# Patient Record
Sex: MALE | Race: WHITE | NOT HISPANIC OR LATINO | Employment: OTHER | URBAN - METROPOLITAN AREA
[De-identification: names, ages, dates, MRNs, and addresses within clinical notes are randomized per-mention and may not be internally consistent; named-entity substitution may affect disease eponyms.]

---

## 2021-10-14 ENCOUNTER — HOSPITAL ENCOUNTER (EMERGENCY)
Facility: HOSPITAL | Age: 41
Discharge: HOME | End: 2021-10-14
Attending: EMERGENCY MEDICINE | Admitting: EMERGENCY MEDICINE

## 2021-10-14 ENCOUNTER — APPOINTMENT (OUTPATIENT)
Dept: CT IMAGING | Facility: HOSPITAL | Age: 41
End: 2021-10-14

## 2021-10-14 ENCOUNTER — APPOINTMENT (OUTPATIENT)
Dept: GENERAL RADIOLOGY | Facility: HOSPITAL | Age: 41
End: 2021-10-14

## 2021-10-14 VITALS
OXYGEN SATURATION: 94 % | DIASTOLIC BLOOD PRESSURE: 73 MMHG | RESPIRATION RATE: 23 BRPM | WEIGHT: 208 LBS | SYSTOLIC BLOOD PRESSURE: 103 MMHG | TEMPERATURE: 98.3 F | HEIGHT: 70 IN | BODY MASS INDEX: 29.78 KG/M2 | HEART RATE: 87 BPM

## 2021-10-14 DIAGNOSIS — N17.0 ACUTE KIDNEY INJURY (AKI) WITH ACUTE TUBULAR NECROSIS (ATN) (HCC): ICD-10-CM

## 2021-10-14 DIAGNOSIS — S20.211A CONTUSION OF RIB ON RIGHT SIDE, INITIAL ENCOUNTER: ICD-10-CM

## 2021-10-14 DIAGNOSIS — E86.0 DEHYDRATION: ICD-10-CM

## 2021-10-14 DIAGNOSIS — S16.1XXA STRAIN OF NECK MUSCLE, INITIAL ENCOUNTER: ICD-10-CM

## 2021-10-14 DIAGNOSIS — S00.93XA CONTUSION OF HEAD, UNSPECIFIED PART OF HEAD, INITIAL ENCOUNTER: ICD-10-CM

## 2021-10-14 DIAGNOSIS — R55 SYNCOPE AND COLLAPSE: Primary | ICD-10-CM

## 2021-10-14 LAB
ALBUMIN SERPL-MCNC: 4.8 G/DL (ref 3.5–5.2)
ALBUMIN/GLOB SERPL: 1.5 G/DL
ALP SERPL-CCNC: 59 U/L (ref 39–117)
ALT SERPL W P-5'-P-CCNC: 54 U/L (ref 1–41)
ANION GAP SERPL CALCULATED.3IONS-SCNC: 25.4 MMOL/L (ref 5–15)
AST SERPL-CCNC: 48 U/L (ref 1–40)
BILIRUB SERPL-MCNC: 0.3 MG/DL (ref 0–1.2)
BUN SERPL-MCNC: 15 MG/DL (ref 6–20)
BUN/CREAT SERPL: 7.9 (ref 7–25)
CALCIUM SPEC-SCNC: 9.5 MG/DL (ref 8.6–10.5)
CHLORIDE SERPL-SCNC: 101 MMOL/L (ref 98–107)
CK SERPL-CCNC: 166 U/L (ref 20–200)
CO2 SERPL-SCNC: 10.6 MMOL/L (ref 22–29)
CREAT SERPL-MCNC: 1.9 MG/DL (ref 0.76–1.27)
DEPRECATED RDW RBC AUTO: 44.1 FL (ref 37–54)
EOSINOPHIL # BLD MANUAL: 0.19 10*3/MM3 (ref 0–0.4)
EOSINOPHIL NFR BLD MANUAL: 2 % (ref 0.3–6.2)
ERYTHROCYTE [DISTWIDTH] IN BLOOD BY AUTOMATED COUNT: 12.1 % (ref 12.3–15.4)
GFR SERPL CREATININE-BSD FRML MDRD: 40 ML/MIN/1.73
GLOBULIN UR ELPH-MCNC: 3.1 GM/DL
GLUCOSE SERPL-MCNC: 133 MG/DL (ref 65–99)
HCT VFR BLD AUTO: 48.3 % (ref 37.5–51)
HGB BLD-MCNC: 16.1 G/DL (ref 13–17.7)
HOLD SPECIMEN: NORMAL
HOLD SPECIMEN: NORMAL
LYMPHOCYTES # BLD MANUAL: 3.53 10*3/MM3 (ref 0.7–3.1)
LYMPHOCYTES NFR BLD MANUAL: 1 % (ref 5–12)
LYMPHOCYTES NFR BLD MANUAL: 31 % (ref 19.6–45.3)
MAGNESIUM SERPL-MCNC: 3.4 MG/DL (ref 1.6–2.6)
MCH RBC QN AUTO: 32.9 PG (ref 26.6–33)
MCHC RBC AUTO-ENTMCNC: 33.3 G/DL (ref 31.5–35.7)
MCV RBC AUTO: 98.6 FL (ref 79–97)
METAMYELOCYTES NFR BLD MANUAL: 2 % (ref 0–0)
MONOCYTES # BLD AUTO: 0.1 10*3/MM3 (ref 0.1–0.9)
NEUTROPHILS # BLD AUTO: 5.53 10*3/MM3 (ref 1.7–7)
NEUTROPHILS NFR BLD MANUAL: 57 % (ref 42.7–76)
NEUTS BAND NFR BLD MANUAL: 1 % (ref 0–5)
PLATELET # BLD AUTO: 208 10*3/MM3 (ref 140–450)
PMV BLD AUTO: 10.8 FL (ref 6–12)
POTASSIUM SERPL-SCNC: 4.7 MMOL/L (ref 3.5–5.2)
PROT SERPL-MCNC: 7.9 G/DL (ref 6–8.5)
RBC # BLD AUTO: 4.9 10*6/MM3 (ref 4.14–5.8)
RBC MORPH BLD: NORMAL
SCAN SLIDE: NORMAL
SMALL PLATELETS BLD QL SMEAR: ADEQUATE
SODIUM SERPL-SCNC: 137 MMOL/L (ref 136–145)
TROPONIN T SERPL-MCNC: <0.01 NG/ML (ref 0–0.03)
VARIANT LYMPHS NFR BLD MANUAL: 6 % (ref 0–5)
WBC # BLD AUTO: 9.54 10*3/MM3 (ref 3.4–10.8)
WBC MORPH BLD: NORMAL
WHOLE BLOOD HOLD SPECIMEN: NORMAL
WHOLE BLOOD HOLD SPECIMEN: NORMAL

## 2021-10-14 PROCEDURE — 82550 ASSAY OF CK (CPK): CPT | Performed by: EMERGENCY MEDICINE

## 2021-10-14 PROCEDURE — 96374 THER/PROPH/DIAG INJ IV PUSH: CPT

## 2021-10-14 PROCEDURE — 70450 CT HEAD/BRAIN W/O DYE: CPT

## 2021-10-14 PROCEDURE — 99284 EMERGENCY DEPT VISIT MOD MDM: CPT

## 2021-10-14 PROCEDURE — 85025 COMPLETE CBC W/AUTO DIFF WBC: CPT

## 2021-10-14 PROCEDURE — 84484 ASSAY OF TROPONIN QUANT: CPT

## 2021-10-14 PROCEDURE — 25010000002 ONDANSETRON PER 1 MG: Performed by: EMERGENCY MEDICINE

## 2021-10-14 PROCEDURE — 80053 COMPREHEN METABOLIC PANEL: CPT

## 2021-10-14 PROCEDURE — 72125 CT NECK SPINE W/O DYE: CPT

## 2021-10-14 PROCEDURE — 93005 ELECTROCARDIOGRAM TRACING: CPT | Performed by: EMERGENCY MEDICINE

## 2021-10-14 PROCEDURE — 83735 ASSAY OF MAGNESIUM: CPT

## 2021-10-14 PROCEDURE — 93005 ELECTROCARDIOGRAM TRACING: CPT

## 2021-10-14 PROCEDURE — 71101 X-RAY EXAM UNILAT RIBS/CHEST: CPT

## 2021-10-14 PROCEDURE — 85007 BL SMEAR W/DIFF WBC COUNT: CPT

## 2021-10-14 PROCEDURE — 93010 ELECTROCARDIOGRAM REPORT: CPT | Performed by: INTERNAL MEDICINE

## 2021-10-14 RX ORDER — ONDANSETRON 2 MG/ML
4 INJECTION INTRAMUSCULAR; INTRAVENOUS ONCE
Status: COMPLETED | OUTPATIENT
Start: 2021-10-14 | End: 2021-10-14

## 2021-10-14 RX ORDER — SODIUM CHLORIDE 0.9 % (FLUSH) 0.9 %
10 SYRINGE (ML) INJECTION AS NEEDED
Status: DISCONTINUED | OUTPATIENT
Start: 2021-10-14 | End: 2021-10-15 | Stop reason: HOSPADM

## 2021-10-14 RX ADMIN — SODIUM CHLORIDE 1000 ML: 9 INJECTION, SOLUTION INTRAVENOUS at 20:20

## 2021-10-14 RX ADMIN — ONDANSETRON 4 MG: 2 INJECTION INTRAMUSCULAR; INTRAVENOUS at 18:39

## 2021-10-14 RX ADMIN — SODIUM CHLORIDE 1000 ML: 9 INJECTION, SOLUTION INTRAVENOUS at 18:39

## 2021-10-14 NOTE — ED PROVIDER NOTES
Time: 6:24 PM EDT  Arrived by: ambulance  Chief Complaint: syncope  History provided by: patient  History is limited by: N/A     History of Present Illness:  Patient is a 41 y.o. year old male that presents to the emergency department with several syncopal episodes after running. Pt reports he fell face first and has a headache. Pt feels fatigued. Pt denies any urinary or bowel incontinence.     Pt drinks and reports his last drink was 7-8 days ago. Pt denies any drug use.       Syncope  Episode history:  Multiple  Most recent episode:  Today  Timing:  Intermittent  Progression:  Resolved  Chronicity:  New  Witnessed: yes    Relieved by:  Nothing  Worsened by:  Nothing  Associated symptoms: headaches    Associated symptoms: no chest pain, no fever, no seizures, no shortness of breath and no vomiting        Similar Symptoms Previously: no  Recently seen: no      Patient Care Team  Primary Care Provider: Provider, No Known    Past Medical History:     Allergies   Allergen Reactions   • Penicillins Unknown - High Severity     History reviewed. No pertinent past medical history.  Past Surgical History:   Procedure Laterality Date   • CHOLECYSTECTOMY       History reviewed. No pertinent family history.    Home Medications:  Prior to Admission medications    Not on File        Social History:   Social History     Tobacco Use   • Smoking status: Never Smoker   • Smokeless tobacco: Never Used   Substance Use Topics   • Alcohol use: Not Currently   • Drug use: Never     Recent travel: not applicable     Review of Systems:  Review of Systems   Constitutional: Positive for fatigue. Negative for chills and fever.   HENT: Negative for nosebleeds.    Eyes: Negative for redness.   Respiratory: Negative for cough and shortness of breath.    Cardiovascular: Positive for syncope. Negative for chest pain.   Gastrointestinal: Negative for diarrhea and vomiting.   Genitourinary: Negative for dysuria and frequency.   Musculoskeletal:  "Negative for back pain and neck pain.   Skin: Negative for rash.   Neurological: Positive for headaches. Negative for seizures.        Physical Exam:  /73   Pulse 87   Temp 98.3 °F (36.8 °C)   Resp 23   Ht 177.8 cm (70\")   Wt 94.3 kg (208 lb)   SpO2 94%   BMI 29.84 kg/m²     Physical Exam  Vitals and nursing note reviewed.   Constitutional:       General: He is not in acute distress.     Appearance: Normal appearance. He is not toxic-appearing.   HENT:      Head: Normocephalic and atraumatic.      Comments: Mild tenderness to the forehead.      Nose: Nose normal.      Mouth/Throat:      Mouth: Mucous membranes are moist.   Eyes:      General: Lids are normal. No scleral icterus.     Conjunctiva/sclera: Conjunctivae normal.   Cardiovascular:      Rate and Rhythm: Normal rate and regular rhythm.      Heart sounds: Normal heart sounds. No murmur heard.      Pulmonary:      Effort: Pulmonary effort is normal. No respiratory distress.      Breath sounds: Normal breath sounds and air entry. No decreased air movement. No decreased breath sounds, wheezing, rhonchi or rales.   Chest:      Chest wall: No tenderness.   Abdominal:      Palpations: Abdomen is soft.      Tenderness: There is no abdominal tenderness. There is no guarding or rebound.   Musculoskeletal:         General: No tenderness. Normal range of motion.      Cervical back: Normal range of motion and neck supple. No tenderness. Spinous process tenderness (mild ) present.      Right lower leg: No edema.      Left lower leg: No edema.      Comments: Mild right lower posterior costal margin tenderness.    Skin:     General: Skin is warm and dry.      Findings: No rash.   Neurological:      Mental Status: He is alert and oriented to person, place, and time.      Sensory: Sensation is intact.      Motor: Motor function is intact.   Psychiatric:         Behavior: Behavior normal.                Medications in the Emergency Department:  Medications "   sodium chloride 0.9 % flush 10 mL (has no administration in time range)   ondansetron (ZOFRAN) injection 4 mg (4 mg Intravenous Given 10/14/21 1839)   sodium chloride 0.9 % bolus 1,000 mL (0 mL Intravenous Stopped 10/14/21 2020)   sodium chloride 0.9 % bolus 1,000 mL (0 mL Intravenous Stopped 10/14/21 2210)        Labs  Lab Results (last 24 hours)     Procedure Component Value Units Date/Time    CBC & Differential [[id number]]  (Abnormal) Collected: 10/14/21 1728    Specimen: Blood Updated: 10/14/21 1803    Narrative:      The following orders were created for panel order CBC & Differential.  Procedure                               Abnormality         Status                     ---------                               -----------         ------                     CBC Auto Differential[[id number]]        Abnormal            Final result               Scan Slide[[id number]]                                       Final result                 Please view results for these tests on the individual orders.    Comprehensive Metabolic Panel [[id number]]  (Abnormal) Collected: 10/14/21 1728    Specimen: Blood Updated: 10/14/21 1806     Glucose 133 mg/dL      BUN 15 mg/dL      Creatinine 1.90 mg/dL      Sodium 137 mmol/L      Potassium 4.7 mmol/L      Comment: Slight hemolysis detected by analyzer. Results may be affected.        Chloride 101 mmol/L      CO2 10.6 mmol/L      Calcium 9.5 mg/dL      Total Protein 7.9 g/dL      Albumin 4.80 g/dL      ALT (SGPT) 54 U/L      AST (SGOT) 48 U/L      Comment: Slight hemolysis detected by analyzer. Results may be affected.        Alkaline Phosphatase 59 U/L      Total Bilirubin 0.3 mg/dL      eGFR Non African Amer 40 mL/min/1.73      Globulin 3.1 gm/dL      A/G Ratio 1.5 g/dL      BUN/Creatinine Ratio 7.9     Anion Gap 25.4 mmol/L     Narrative:      GFR Normal >60  Chronic Kidney Disease <60  Kidney Failure <15      Magnesium [[id number]]  (Abnormal) Collected: 10/14/21 1728    Specimen:  Blood Updated: 10/14/21 1759     Magnesium 3.4 mg/dL     Troponin [[id number]]  (Normal) Collected: 10/14/21 1728    Specimen: Blood Updated: 10/14/21 1759     Troponin T <0.010 ng/mL     Narrative:      Troponin T Reference Range:  <= 0.03 ng/mL-   Negative for AMI  >0.03 ng/mL-     Abnormal for myocardial necrosis.  Clinicians would have to utilize clinical acumen, EKG, Troponin and serial changes to determine if it is an Acute Myocardial Infarction or myocardial injury due to an underlying chronic condition.       Results may be falsely decreased if patient taking Biotin.      CBC Auto Differential [[id number]]  (Abnormal) Collected: 10/14/21 1728    Specimen: Blood Updated: 10/14/21 1803     WBC 9.54 10*3/mm3      RBC 4.90 10*6/mm3      Hemoglobin 16.1 g/dL      Hematocrit 48.3 %      MCV 98.6 fL      MCH 32.9 pg      MCHC 33.3 g/dL      RDW 12.1 %      RDW-SD 44.1 fl      MPV 10.8 fL      Platelets 208 10*3/mm3     Scan Slide [[id number]] Collected: 10/14/21 1728    Specimen: Blood Updated: 10/14/21 1803     Scan Slide --     Comment: See Manual Differential Results       Manual Differential [[id number]]  (Abnormal) Collected: 10/14/21 1728    Specimen: Blood Updated: 10/14/21 1804     Neutrophil % 57.0 %      Lymphocyte % 31.0 %      Monocyte % 1.0 %      Eosinophil % 2.0 %      Bands %  1.0 %      Metamyelocyte % 2.0 %      Atypical Lymphocyte % 6.0 %      Neutrophils Absolute 5.53 10*3/mm3      Lymphocytes Absolute 3.53 10*3/mm3      Monocytes Absolute 0.10 10*3/mm3      Eosinophils Absolute 0.19 10*3/mm3      RBC Morphology Normal     WBC Morphology Normal     Platelet Estimate Adequate     Comment: Corrected result. Previous result was Decreased on 10/14/2021 at 1803 EDT.       CK [[id number]]  (Normal) Collected: 10/14/21 1728    Specimen: Blood Updated: 10/14/21 1848     Creatine Kinase 166 U/L            Imaging:  XR Ribs Right With PA Chest    Result Date: 10/14/2021  PROCEDURE: XR RIBS RIGHT W PA CHEST   COMPARISON: None  INDICATIONS: RIGHT LOWER RIB PAIN/FALL  FINDINGS:  The lungs are clear bilaterally.  The cardiac and mediastinal silhouettes appear normal.  No effusion is seen.  No pneumothorax is evident.  No fractures identified.  CONCLUSION:  1. No acute cardiopulmonary disease 2. No fracture or pneumothorax     Winston Alves M.D.       Electronically Signed and Approved By: Winston Alves M.D. on 10/14/2021 at 19:23             CT Head Without Contrast    Result Date: 10/14/2021  PROCEDURE: CT HEAD WO CONTRAST  COMPARISON:  None INDICATIONS: Head Injury, fall  PROTOCOL:   Standard imaging protocol performed    RADIATION:   DLP: 952.9 mGy*cm   MA and/or KV was adjusted to minimize radiation dose.     TECHNIQUE: After obtaining the patient's consent, CT images were obtained without non-ionic intravenous contrast material.  FINDINGS:  No focal brain lesion, mass or intracranial hemorrhage is seen.  The ventricles are normal in size and configuration.  No fracture is identified.  CONCLUSION:  1. No fracture or intracranial hemorrhage     Winston Alves M.D.       Electronically Signed and Approved By: Winston Alves M.D. on 10/14/2021 at 19:30             CT Cervical Spine Without Contrast    Result Date: 10/14/2021  PROCEDURE: CT CERVICAL SPINE WO CONTRAST  COMPARISON: None  INDICATIONS: Neck Pain, fall  PROTOCOL:   Standard imaging protocol performed    RADIATION:   DLP: 467.9 mGy*cm   MA and/or KV was adjusted to minimize radiation dose.     TECHNIQUE: After obtaining the patient's consent, multi-planar CT images were created without contrast material.   FINDINGS:  No fracture or malalignment is identified.  Paraspinal soft tissues appear normal.  Mild degenerative disc changes are noted at C6-7.  CONCLUSION:  1. No acute fracture or malalignment     Winston Alves M.D.       Electronically Signed and Approved By: Winston Alves M.D. on 10/14/2021 at 19:35               Procedures:  Procedures    Progress                             Medical Decision Making:  MDM  Number of Diagnoses or Management Options  Acute kidney injury (ARISTIDES) with acute tubular necrosis (ATN) (HCC)  Contusion of head, unspecified part of head, initial encounter  Contusion of rib on right side, initial encounter  Dehydration  Strain of neck muscle, initial encounter  Syncope and collapse  Diagnosis management comments: Patient presents with multiple syncopal episodes.  Old records reviewed.  Differential diagnostic considerations include dehydration versus heat syncope versus cardiac dysrhythmia.  Labs include CBC which is normal.  Chemistries remarkable for creatinine 1.9 cm CK level normal.  EKG did not demonstrate rhythm disturbances.  CT scan of the head and cervical spine did not demonstrate acute findings as read by radiology and reviewed by me.  ED course patient was somewhat hypotensive and received IV fluids with improvement in his blood pressure was discharged stable and improved.  Findings most consistent with heat syncope and dehydration.  He is discharged stable advised to have his creatinine level rechecked next week and hydrate aggressively over the weekend and avoid exertion in the interim.       Amount and/or Complexity of Data Reviewed  Clinical lab tests: reviewed  Tests in the radiology section of CPT®: reviewed  Tests in the medicine section of CPT®: reviewed    Risk of Complications, Morbidity, and/or Mortality  Presenting problems: high         Final diagnoses:   Syncope and collapse   Dehydration   Acute kidney injury (ARISTIDES) with acute tubular necrosis (ATN) (HCC)   Contusion of rib on right side, initial encounter   Contusion of head, unspecified part of head, initial encounter   Strain of neck muscle, initial encounter        Disposition:  ED Disposition     ED Disposition Condition Comment    Discharge Stable           This medical record created using voice recognition software and may contain unintended errors.    Documentation  assistance provided by Joann Patton acting as scribe for Esvin Pardo MD. Information recorded by the scribe was done at my direction and has been verified and validated by me.          Joann Patton  10/14/21 1829       Esvin Pardo MD  10/14/21 [id number]

## 2021-10-15 LAB — QT INTERVAL: 323 MS
